# Patient Record
Sex: FEMALE | Race: WHITE | NOT HISPANIC OR LATINO | Employment: FULL TIME | URBAN - METROPOLITAN AREA
[De-identification: names, ages, dates, MRNs, and addresses within clinical notes are randomized per-mention and may not be internally consistent; named-entity substitution may affect disease eponyms.]

---

## 2023-10-24 ENCOUNTER — OFFICE VISIT (OUTPATIENT)
Dept: OBSTETRICS AND GYNECOLOGY | Facility: CLINIC | Age: 23
End: 2023-10-24
Payer: COMMERCIAL

## 2023-10-24 VITALS
SYSTOLIC BLOOD PRESSURE: 120 MMHG | HEIGHT: 70 IN | DIASTOLIC BLOOD PRESSURE: 76 MMHG | BODY MASS INDEX: 35.07 KG/M2 | WEIGHT: 245 LBS

## 2023-10-24 DIAGNOSIS — N64.4 BREAST PAIN: Primary | ICD-10-CM

## 2023-10-24 PROCEDURE — 99213 OFFICE O/P EST LOW 20 MIN: CPT | Performed by: OBSTETRICS & GYNECOLOGY

## 2023-10-24 RX ORDER — ESCITALOPRAM OXALATE 20 MG/1
TABLET ORAL
COMMUNITY
Start: 2023-10-22

## 2023-10-24 RX ORDER — ATOMOXETINE 40 MG/1
CAPSULE ORAL
COMMUNITY
Start: 2023-10-22

## 2023-10-24 NOTE — PROGRESS NOTES
Subjective   Patient ID: Yara Irizarry is a 23 y.o. female who presents for Breast Problem (Left breast pain with lump in middle stated not hot to touch that she noticed ).  Breast Problem    Jade is a 23-year-old G0 sexually active using condoms with LMP 3 weeks ago who presents with a chief complaint of left breast lump and pain for the last 2 weeks.  She denies any nipple discharge but is feeling a lump underneath the left nipple.  She denies any significant family history of breast cancer however she says her father is adopted and she is not aware of most of her family history.  She denies any trauma to the breast.  She currently vapes.  She says that she uses sports bra however when she is at home she is not wearing any bras.  She denies any caffeine use.  Review of Systems   All other systems reviewed and are negative.      Objective   Physical Exam  Exam conducted with a chaperone present.   Constitutional:       Appearance: Normal appearance.   HENT:      Head: Normocephalic.   Cardiovascular:      Rate and Rhythm: Normal rate and regular rhythm.   Pulmonary:      Effort: Pulmonary effort is normal.      Breath sounds: Normal breath sounds.   Chest:   Breasts:     Right: Normal.      Left: Normal.      Comments: No dominant mass but tender  Neurological:      Mental Status: She is alert.         Assessment/Plan   Problem List Items Addressed This Visit    None  Visit Diagnoses         Codes    Breast pain    -  Primary N64.4        Left breast pain exam was reassuring today I did not feel any dominant mass however I have recommended breast ultrasound and for her to decrease vaping and wear sports bra days and nights for at least 6 weeks also some supplements such as iodine can help with daily pain.  She will follow-up with me after 1 cycle for reexamination.